# Patient Record
Sex: FEMALE | Race: WHITE | NOT HISPANIC OR LATINO | Employment: FULL TIME | URBAN - METROPOLITAN AREA
[De-identification: names, ages, dates, MRNs, and addresses within clinical notes are randomized per-mention and may not be internally consistent; named-entity substitution may affect disease eponyms.]

---

## 2019-04-12 ENCOUNTER — OFFICE VISIT (OUTPATIENT)
Dept: OBGYN CLINIC | Facility: CLINIC | Age: 49
End: 2019-04-12
Payer: COMMERCIAL

## 2019-04-12 ENCOUNTER — APPOINTMENT (OUTPATIENT)
Dept: RADIOLOGY | Facility: CLINIC | Age: 49
End: 2019-04-12
Payer: COMMERCIAL

## 2019-04-12 VITALS
DIASTOLIC BLOOD PRESSURE: 81 MMHG | HEIGHT: 64 IN | SYSTOLIC BLOOD PRESSURE: 121 MMHG | BODY MASS INDEX: 33.29 KG/M2 | WEIGHT: 195 LBS | HEART RATE: 76 BPM

## 2019-04-12 DIAGNOSIS — M25.572 LEFT ANKLE PAIN, UNSPECIFIED CHRONICITY: ICD-10-CM

## 2019-04-12 DIAGNOSIS — M84.375A STRESS FRACTURE OF CALCANEUS, LEFT, INITIAL ENCOUNTER: Primary | ICD-10-CM

## 2019-04-12 PROCEDURE — 73610 X-RAY EXAM OF ANKLE: CPT

## 2019-04-12 PROCEDURE — 99203 OFFICE O/P NEW LOW 30 MIN: CPT | Performed by: ORTHOPAEDIC SURGERY

## 2019-04-12 RX ORDER — MELOXICAM 15 MG/1
1 TABLET ORAL
COMMUNITY
Start: 2019-03-05

## 2019-05-24 ENCOUNTER — OFFICE VISIT (OUTPATIENT)
Dept: OBGYN CLINIC | Facility: HOSPITAL | Age: 49
End: 2019-05-24
Payer: COMMERCIAL

## 2019-05-24 VITALS
DIASTOLIC BLOOD PRESSURE: 74 MMHG | BODY MASS INDEX: 33.47 KG/M2 | SYSTOLIC BLOOD PRESSURE: 117 MMHG | HEIGHT: 64 IN | HEART RATE: 96 BPM

## 2019-05-24 DIAGNOSIS — M84.375D: ICD-10-CM

## 2019-05-24 DIAGNOSIS — M76.822 POSTERIOR TIBIAL TENDONITIS, LEFT: Primary | ICD-10-CM

## 2019-05-24 PROCEDURE — 99213 OFFICE O/P EST LOW 20 MIN: CPT | Performed by: ORTHOPAEDIC SURGERY

## 2019-08-06 ENCOUNTER — OFFICE VISIT (OUTPATIENT)
Dept: OBGYN CLINIC | Facility: CLINIC | Age: 49
End: 2019-08-06
Payer: COMMERCIAL

## 2019-08-06 VITALS
HEIGHT: 64 IN | DIASTOLIC BLOOD PRESSURE: 74 MMHG | BODY MASS INDEX: 33.29 KG/M2 | SYSTOLIC BLOOD PRESSURE: 126 MMHG | WEIGHT: 195 LBS | HEART RATE: 76 BPM

## 2019-08-06 DIAGNOSIS — M72.2 PLANTAR FASCIITIS OF LEFT FOOT: ICD-10-CM

## 2019-08-06 DIAGNOSIS — M76.62 ACHILLES TENDINITIS OF LEFT LOWER EXTREMITY: Primary | ICD-10-CM

## 2019-08-06 PROCEDURE — 99213 OFFICE O/P EST LOW 20 MIN: CPT | Performed by: ORTHOPAEDIC SURGERY

## 2019-08-06 RX ORDER — PREDNISONE 20 MG/1
TABLET ORAL
COMMUNITY
Start: 2019-06-26 | End: 2020-02-03

## 2019-08-06 RX ORDER — LEVOCETIRIZINE DIHYDROCHLORIDE 5 MG/1
TABLET, FILM COATED ORAL
COMMUNITY
Start: 2019-06-26

## 2019-08-06 NOTE — PATIENT INSTRUCTIONS
Plantar Fasciitis   Page Content   If your first few steps out of bed in the morning cause severe pain in the heel of your foot, you may have plantar fasciitis (fashee-EYE-tiss), an overuse injury that affects the sole of the foot  A diagnosis of plantar fasciitis means you have inflamed the tough, fibrous band of tissue (fascia) connecting your heel bone to the base of your toes  You're more likely to develop the condition if you're female, overweight or have a job that requires a lot of walking or standing on hard surfaces  You're also at risk if you walk or run for exercise, especially if you have tight calf muscles that limit how far you can flex your ankles  People with very flat feet or very high arches also are more prone to plantar fasciitis  The condition typically starts gradually with mild pain at the heel bone often referred to as a stone bruise  You're more likely to feel it after (not during) exercise  The pain classically occurs right after getting up in the morning and after a period of sitting  If you don't treat plantar fasciitis, it may become a chronic condition  You may not be able to keep up your level of activity, and you may develop symptoms of foot, knee, hip and back problems because plantar fasciitis can change the way you walk  Treatment  Stretching is the best treatment for plantar fasciitis  It may help to try to keep weight off your foot until the initial inflammation goes away  You can also apply ice to the sore area for 20 minutes three or four times a day to relieve your symptoms  Often a doctor will prescribe a nonsteroidal anti-inflammatory medication such as ibuprofen or naproxen  Home exercises to stretch your Achilles tendon and plantar fascia are the mainstay of treatment and reduce the chance of recurrence  In one exercise, you lean forward against a wall with one knee straight and heel on the ground  Your other knee is bent   Your heel cord and foot arch stretch as you lean  Hold for 10 seconds, relax and straighten up  Repeat 20 times for each sore heel  It is important to keep the knee fully extended on the side being stretched  In another exercise, you lean forward onto a countertop, spreading your feet apart with one foot in front of the other  Flex your knees and squat down, keeping your heels on the ground as long as possible  Your heel cords and foot arches will stretch as the heels come up in the stretch  Hold for 10 seconds, relax and straighten up  Repeat 20 times  About 90 percent of people with plantar fasciitis improve significantly after two months of initial treatment  You may be advised to use shoes with shock-absorbing soles or fitted with an off-the-shelf shoe insert device like a rubber heel pad  Your foot may be taped into a specific position  If your plantar fasciitis continues after a few months of conservative treatment, your doctor may inject your heel with steroidal anti-inflammatory medication  If you still have symptoms, you may need to wear a walking cast for two to three weeks or a positional splint when you sleep  In a few cases, surgery is needed for chronically contracted tissue  Plantar Fascia-Specific Stretching Program  1  Cross your affected leg over your other leg  2  Using the hand on your affected side, take hold of your affected foot and pull your toes back towards shin  This creates tension/stretch in the arch of the foot/plantar fascia  3  Check for the appropriate stretch position by gently rubbing the thumb of your unaffected side left to right over the arch of the affected foot  The plantar fascia should feel firm, like a guitar string  4  Hold the stretch for a count of 10  A set is 10 repetitions  Perform at least three sets of stretches per day  You cannot perform the stretch too often   The most important times to stretch are before taking the first step in the morning and before standing after a period of prolonged sitting  Anti-inflammatory Medication  Anti-inflammatory medications can help decrease the inflammation in the arch and heel of your foot  These medications include Advil®, Motrin®, Ibuprofen, and Aleve®  1  Use the medication as directed on the package  If you tolerate it well, take it daily for two weeks then discontinue for one week  If symptoms worsen or return, resume for two weeks, then stop  2  You should eat when taking these medications, as they can be hard on your stomach  Arch Support  1  Over the counter inserts [facility]'s) provide added arch support and soft cushion  2  Based on the individual needs of your foot, you may require custom inserts  Additional Stretch: Achilles Tendon Stretch  1  Place a shoe insert under your affected foot  2  Place your affected leg behind your unaffected leg with the toes of your back foot pointed towards the heel of your other foot  3  Lean into the wall  4  Bend your front knee while keeping your back leg straight with your heel firmly on the ground  5  Hold the stretch for a count of 10  A set is 10 repetitions  6  Perform the stretch at least three times a day  Achilles Tendonitis (Tendinitis)  Tendonitis a swelling and soreness of the tendon  The pain in the tendon (cord like structure which attaches muscle to bone) is produced by tiny tears and the inflammation present in that tendon  It commonly occurs at the shoulders, heels, and elbows  It is usually caused by overusing the tendon and joint involved  Achilles tendonitis involves the Achilles tendon  This is the large tendon in the back of the leg just above the foot  It attaches the large muscles of the lower leg to the heel bone (called calcaneus)  This diagnosis (learning what is wrong) is made by examination  X-rays will be generally be normal if only tendonitis is present   However, occasionally with insertional achilles tendinitis, there can be a calcification (enthesopathy/bone spur) noted  HOME CARE INSTRUCTIONS  · Apply ice to the injury for 10 to 20 minutes 3 or 4 times per day  Put the ice in a plastic bag and place a towel between the bag of ice and your skin  · Use crutches as instructed  · Try to avoid use other than gentle range of motion while the tendon is painful  Do not resume use until instructed by your caregiver  Then begin use gradually  Do not increase use to the point of pain  If pain does develop, decrease use and continue the above measures  Gradually increase activities that do not cause discomfort until you gradually achieve normal use  · Only take over-the-counter or prescription medicines for pain, discomfort, or fever as directed by your caregiver  · Specific achilles stretches and rehab are usually required for tendinitis that does not improve with acute treatment  Typically these are done under the care of a physical therapist     [address] IF:  · Your pain and swelling increase or pain is uncontrolled with medications  · You develop new, unexplained problems (symptoms) or an increase of the symptoms that brought you to your caregiver  · You develop an inability to move your toes or foot, develop warmth and swelling in your foot, or begin running an unexplained temperature  MAKE SURE YOU:   · Understand these instructions  · Will watch your condition  · Will get help right away if you are not doing well or get worse  Rehab  Achilles tendonitis is disorder of the Achilles tendon  The Achiles tendon connects the large calf muscles (Gastrocnemius and Soleus) to the heel bone (calcaneus)  This tendon is sometimes called the heel cord  It is important for pushing-off and standing on your toes and is important for walking, running, or jumping  Tendonitis often caused by overuse and repetitive microtrauma      SYMPTOMS  · Pain, tenderness, swelling, warmth, and redness may occur over the Achilles tendon even at rest    · Pain with pushing off, or flexing or extending the ankle  · Pain that is worsened after or during activity  CAUSES  · Over use sometimes seen with rapid increase in exercise programs or in sports requiring running and jumping  · Poor physical conditioning (strength and flexibility/endurance)  · Running sports, especially training running down hills  · Inadequate warm-up before practice or play or failure to stretch before participation  · Injury to the tendon  PREVENTION   · Warm up and stretch before practice or competition  · Allow time for adequate rest and recovery between practices and competition  · Keep up conditioning  · Keep up ankle and leg flexibility  · Improve or keep muscle strength and endurance  · Improve cardiovascular fitness  · Use proper technique  · Use of proper equipment (shoes, skates, etc)   · To help prevent recurrence, taping, protective strapping, or an adhesive bandage may be recommended for several weeks after healing is complete  PROGNOSIS  · Recovery may take weeks to several months to heal    · Longer recovery is expected if symptoms have been prolonged   · Recovery is usually quicker if the inflammation is due to a direct blow as compared with overuse or sudden strain  COMPLICATIONS   · Healing time will be prolonged if the condition is not correctly treated  The injury must be given plenty of time to heal    · Symptoms can reoccur if activity is resumed too soon  · Untreated, tendinitis may increase the risk of tendon rupture requiring additional time for recovery and possibly surgery  TREATMENT   · The first treatment consists of, rest, anti-inflammatory medication and ice to relieve the pain  · Stretching and strengthening exercises after resolution of pain, will likely help reduce the risk of recurrence   Referral to a physical therapist or  for further evaluation and treatment may be helpful  · A walking boot or cast may be recommended to rest the Achilles tendon  This can help break the cycle of inflammation and microtrauma  · Arch supports (orthotics ) may be prescribed or recommended by your caregiver as an adjunct to therapy and rest    · Surgery to remove the inflamed tendon lining or degenerated tendon tissue is rarely necessary and has shown less than predictable results  MEDICATION   · Nonsteroidal anti-inflammatory medications, such as aspirin and ibuprofen, may be used for pain and inflammation relief  Do not take within 7 days before surgery  Take these as directed by your caregiver  Contact your caregiver immediately if any bleeding, stomach upset, or signs of allergic reaction occur  Other minor pain relievers, such as acetaminophen, may also be used  · Pain relievers may be prescribed as necessary by your caregiver  Do not take prescription pain medication for longer than 4 to 7 days  Use only as directed and only as much as you need  · Cortisone injections are rarely if ever indicated  Cortisone injections may weaken tendons and predispose to rupture  It is better to give the condition more time to heal than to use them  HEAT AND COLD:   · Cold is used to relieve pain and reduce inflammation for acute and chronic Achilles tendinitis  Cold should be applied for 10 to 15 minutes every 2 to 3 hours for inflammation and pain and immediately after any activity that aggravates your symptoms  Use ice packs or an ice massage  · Heat may be used before performing stretching and strengthening activities prescribed by your caregiver  Use a heat pack or a warm soak  SEEK MEDICAL CARE IF:   · Symptoms get worse or do not improve in 2 weeks despite treatment  · New, unexplained symptoms develop  Drugs used in treatment may produce side effects       EXERCISES  RANGE OF MOTION AND STRETCHING EXERCISES - Achilles Tendinitis   These exercises may help you when beginning to rehabilitate your injury  Your symptoms may resolve with or without further involvement from your physician, physical therapist or   While completing these exercises, remember:   · Restoring tissue flexibility helps normal motion to return to the joints  This allows healthier, less painful movement and activity  · An effective stretch should be held for at least 30 seconds  · A stretch should never be painful  You should only feel a gentle lengthening or release in the stretched tissue  STRETCH - Gastroc, Standing   · Place hands on wall  · Extend leg, keeping the front knee somewhat bent  · Slightly point your toes inward on your back foot  · Keeping your heel on the floor and your knee straight, shift your weight toward the wall, not allowing your back to arch  · You should feel a gentle stretch in the calf  Hold this position for __________ seconds  STRETCH - Soleus, Standing   · Place hands on wall  · Extend leg, keeping the other knee somewhat bent  · Slightly point your toes inward on your back foot  · Keep your heel on the floor, bend your back knee, and slightly shift your weight over the back leg so that you feel a gentle stretch deep in your back calf  · Hold this position for 10 seconds     STRETCH - Gastrocsoleus, Standing   Note: This exercise can place a lot of stress on your foot and ankle  Please complete this exercise only if specifically instructed by your caregiver  · Place the ball of your foot on a step, keeping your other foot firmly on the same step  · Hold on to the wall or a rail for balance  · Slowly lift your other foot, allowing your body weight to press your heel down over the edge of the step  · You should feel a stretch in your calf  · Hold this position for 10 seconds  · Repeat this exercise with a slight bend in your knee       STRENGTHENING EXERCISES - Achilles Tendinitis  These exercises may help you when beginning to rehabilitate your injury  They may resolve your symptoms with or without further involvement from your physician, physical therapist or   While completing these exercises, remember:   · Muscles can gain both the endurance and the strength needed for everyday activities through controlled exercises  · Complete these exercises as instructed by your physician, physical therapist or   Progress the resistance and repetitions only as guided  · You may experience muscle soreness or fatigue, but the pain or discomfort you are trying to eliminate should never worsen during these exercises  If this pain does worsen, stop and make certain you are following the directions exactly  If the pain is still present after adjustments, discontinue the exercise until you can discuss the trouble with your clinician  STRENGTH - Plantar-flexors   · Sit with your __________ leg extended  Holding onto both ends of a rubber exercise band/tubing, loop it around the ball of your foot  Keep a slight tension in the band  · Slowly push your toes away from you, pointing them downward  · Hold this position for __________ seconds  Return slowly, controlling the tension in the band/tubing  STRENGTH - Plantar-flexors   · Stand with your feet shoulder width apart  Steady yourself with a wall or table using as little support as needed  · Keeping your weight evenly spread over the width of your feet, rise up on your toes  *     STRENGTH - Plantar-flexors, Eccentric   Note: This exercise can place a lot of stress on your foot and ankle  Please complete this exercise only if specifically instructed by your caregiver  · Place the balls of your feet on a step  With your hands, use only enough support from a wall or rail to keep your balance  · Keep your knees straight and rise up on your toes  · Slowly shift your weight entirely to your toes and  your opposite foot   Gently and with controlled movement, lower your weight through your foot so that your heel drops below the level of the step  You will feel a slight stretch in the back of your calf at the end position  · Use the healthy leg to help rise up onto the balls of both feet, then lower weight only on the leg again  Build up to 15 repetitions  Then progress to 3 consecutive sets of 15 repetitions  *   · After completing the above exercise, complete the same exercise with a slight knee bend (about 30 degrees)  Again, build up to 15 repetitions  Then progress to 3 consecutive sets of 15 repetitions  *   *When you easily complete 3 sets of 15, your physician, physical therapist or  may advise you to add resistance by wearing a backpack filled with additional weight  STRENGTH - Plantar Flexors, Seated   · Sit on a chair that allows your feet to rest flat on the ground  If necessary, sit at the edge of the chair  · Keeping your toes firmly on the ground, lift your heel as far as you can without increasing any discomfort in your ankle  [email or web address]  pdf

## 2019-08-06 NOTE — PROGRESS NOTES
YULISA Tanenr  Attending, Orthopaedic Surgery  [facility] [address] [facility] [address] [facility]      ORTHOPAEDIC FOOT AND ANKLE CLINIC VISIT     Assessment:     Encounter Diagnoses   Name Primary?  Achilles tendinitis of left lower extremity Yes    Plantar fasciitis of left foot             Plan:   · The patient verbalized understanding of exam findings and treatment plan  We engaged in the shared decision-making process and treatment options were discussed at length with the patient  Surgical and conservative management discussed today along with risks and benefits  · Activity modification was discussed and advised, AVSS returning to walking instructions given  · Continue with visco heel cups in proper shoe wear  · Perform HEP, AVSS for achilles tendinitis and plantar fasciitis with exercises was given  · Utilize ice massage after long periods of time on feet  Return in about 2 months (around 10/6/2019) for Recheck left foot   Leona Gao History of Present Illness:   Chief Complaint:   Chief Complaint   Patient presents with    Left Ankle - Follow-up     Kyle Saul is a 44 y o  female who is being seen in follow-up for left posterior tibialis tendonitis  When we last saw she we recommended physical therapy  Pain has 100% improved for posterior tibialis tendonitis and heel pain associated with calcaneal stress fracture  Pain is present of the plantar fascia and posterior heel at the achilles insertion with minimal radiating and described as sharp and severe  She states pain of the plantar fascia when standing after periods of rest and a few steps in the morning  Achilles tendon pain after doing stretching exercises on the stairs       Pain/symptom timing:  Worse during the day when active  Pain/symptom context:  Worse with activites and work  Pain/symptom modifying factors:  Rest makes better, activities make worse  Pain/symptom associated signs/symptoms: none    Prior treatment   · NSAIDsYes   · Injections No   · Bracing/Orthotics Yes    · Physical Therapy Yes     Orthopedic Surgical History:   See below  Past Medical, Surgical and Social History:  Past Medical History:  has no past medical history on file  Problem List: does not have any pertinent problems on file  Past Surgical History:  has a past surgical history that includes Gallbladder surgery and Hand surgery  Family History: family history includes Cancer in her mother  Social History:  reports that she has never smoked  She has never used smokeless tobacco  Her alcohol and drug histories are not on file  Current Medications: has a current medication list which includes the following prescription(s): levocetirizine, meloxicam, and prednisone  Allergies: has No Known Allergies  Review of Systems:  General- denies fever/chills  HEENT- denies hearing loss or sore throat  Eyes- denies eye pain or visual disturbances, denies red eyes  Respiratory- denies cough or SOB  Cardio- denies chest pain or palpitations  GI- denies abdominal pain  Endocrine- denies urinary frequency  Urinary- denies pain with urination  Musculoskeletal- Negative except noted above  Skin- denies rashes or wounds  Neurological- denies dizziness or headache  Psychiatric- denies anxiety or difficulty concentrating    Physical Exam:   /74 (BP Location: Right arm, Patient Position: Sitting, Cuff Size: Adult)   Pulse 76   Ht 5' 4" (1 626 m)   Wt 88 5 kg (195 lb)   BMI 33 47 kg/m²   General/Constitutional: No apparent distress: well-nourished and well developed  Eyes: normal ocular motion  Lymphatic: No appreciable lymphadenopathy  Respiratory: Non-labored breathing  Vascular: No edema, swelling or tenderness, except as noted in detailed exam   Integumentary: No impressive skin lesions present, except as noted in detailed exam   Neuro: No ataxia or tremors noted  Psych: Normal mood and affect, oriented to person, place and time  Appropriate affect  Musculoskeletal: Normal, except as noted in detailed exam and in HPI  Examination    Left    Gait Normal   Musculoskeletal Tender to palpation at achilles tendon insertion posterior heel, medial of band of the plantar fascia  No ttp of the ptt, No ttp of the calcaneus  Skin normal    Nails Normal    Range of Motion  full degrees dorsiflexion, full degrees plantarflexion  Subtalar motion: normal     Stability Stable    Muscle Strength 5/5 tibialis anterior  5/5 gastrocnemius-soleus  5/5 posterior tibialis  5/5 peroneal/eversion strength  5/5 EHL  5/5 FHL    Neurologic Normal    Sensation Intact to light touch throughout sural, saphenous, superficial peroneal, deep peroneal and medial/lateral plantar nerve distributions  [place] 5 07 filament (10g) testing deferred  Cardiovascular Brisk capillary refill < 2 seconds,intact DP and PT pulses    Special Tests Calcaneal Squeeze Test:  Negative  Imaging Studies:   No new imaging    Scribe Attestation    I,:   Ronaldo Oscar am acting as a scribe while in the presence of the attending physician :        I,:   Amanda Israel MD personally performed the services described in this documentation    as scribed in my presence :              Lyanne Coddington Lachman, MD  Foot & Ankle Surgery   Department of [address] [place] [address]      I personally performed the service  Lyanne Coddington Lachman, MD

## 2019-10-15 ENCOUNTER — OFFICE VISIT (OUTPATIENT)
Dept: OBGYN CLINIC | Facility: CLINIC | Age: 49
End: 2019-10-15
Payer: COMMERCIAL

## 2019-10-15 VITALS
WEIGHT: 181 LBS | HEIGHT: 64 IN | DIASTOLIC BLOOD PRESSURE: 78 MMHG | HEART RATE: 94 BPM | BODY MASS INDEX: 30.9 KG/M2 | SYSTOLIC BLOOD PRESSURE: 122 MMHG

## 2019-10-15 DIAGNOSIS — M72.2 PLANTAR FASCIITIS OF LEFT FOOT: Primary | ICD-10-CM

## 2019-10-15 DIAGNOSIS — M84.375D: ICD-10-CM

## 2019-10-15 DIAGNOSIS — M76.822 POSTERIOR TIBIAL TENDONITIS, LEFT: ICD-10-CM

## 2019-10-15 PROCEDURE — 99213 OFFICE O/P EST LOW 20 MIN: CPT | Performed by: ORTHOPAEDIC SURGERY

## 2019-10-15 NOTE — PROGRESS NOTES
YULISA Linares  Attending, Orthopaedic Surgery  [facility] and [address] [facility] [address] [facility]      ORTHOPAEDIC FOOT AND ANKLE CLINIC VISIT     Assessment:     Encounter Diagnoses   Name Primary?  Posterior tibial tendonitis, left     Stress fracture of calcaneus with routine healing, left     Plantar fasciitis of left foot Yes            Plan:   · The patient verbalized understanding of exam findings and treatment plan  We engaged in the shared decision-making process and treatment options were discussed at length with the patient  Surgical and conservative management discussed today along with risks and benefits  · Her calcaneal stress fracture and Posterior tibial tendonitis has completely resolved  · She has plantar fascia pain which we expect to continue to improve  · Visco heels  · Rest and activity modification  · Ice and elevation, NSAIDs as needed  · Follow-up PRN      History of Present Illness:   Chief Complaint: Left plantar heel pain  Shari Hull is a 44 y o  female who is being seen in follow-up for left plantar fasciitis, PT Tendonitis, calcaneal stress fracture  When we last saw she we recommended PT  Pain has slightly improved  Residual pain is localized at plantar medial heel with minimal radiating and described as sharp and severe  Pain/symptom timing:  Worse during the day when active  Pain/symptom context:  Worse with activites and work  Pain/symptom modifying factors:  Rest makes better, activities make worse  Pain/symptom associated signs/symptoms: none    Prior treatment   · NSAIDsYes   · Injections No   · Bracing/Orthotics Yes    · Physical Therapy Yes     Orthopedic Surgical History:   See below    Past Medical, Surgical and Social History:  Past Medical History:  has no past medical history on file  Problem List: does not have any pertinent problems on file    Past Surgical History:  has a past surgical history that includes Gallbladder surgery and Hand surgery  Family History: family history includes Cancer in her mother  Social History:  reports that she has never smoked  She has never used smokeless tobacco  Her alcohol and drug histories are not on file  Current Medications: has a current medication list which includes the following prescription(s): levocetirizine, meloxicam, and prednisone  Allergies: has No Known Allergies  Review of Systems:  General- denies fever/chills  HEENT- denies hearing loss or sore throat  Eyes- denies eye pain or visual disturbances, denies red eyes  Respiratory- denies cough or SOB  Cardio- denies chest pain or palpitations  GI- denies abdominal pain  Endocrine- denies urinary frequency  Urinary- denies pain with urination  Musculoskeletal- Negative except noted above  Skin- denies rashes or wounds  Neurological- denies dizziness or headache  Psychiatric- denies anxiety or difficulty concentrating    Physical Exam:   There were no vitals taken for this visit  General/Constitutional: No apparent distress: well-nourished and well developed  Eyes: normal ocular motion  Lymphatic: No appreciable lymphadenopathy  Respiratory: Non-labored breathing  Vascular: No edema, swelling or tenderness, except as noted in detailed exam   Integumentary: No impressive skin lesions present, except as noted in detailed exam   Neuro: No ataxia or tremors noted  Psych: Normal mood and affect, oriented to person, place and time  Appropriate affect  Musculoskeletal: Normal, except as noted in detailed exam and in HPI      Examination    Left    Gait Normal   Musculoskeletal Tender to palpation at plantar medial heel    Skin Normal       Nails Normal    Range of Motion  20 degrees dorsiflexion, 40 degrees plantarflexion  Subtalar motion: normal    Stability Stable    Muscle Strength 5/5 tibialis anterior  5/5 gastrocnemius-soleus  5/5 posterior tibialis  5/5 peroneal/eversion strength  5/5 EHL  5/5 FHL    Neurologic Normal    Sensation Intact to light touch throughout sural, saphenous, superficial peroneal, deep peroneal and medial/lateral plantar nerve distributions  [place] 5 07 filament (10g) testing deferred  Cardiovascular Brisk capillary refill < 2 seconds,intact DP and PT pulses    Special Tests None      Imaging Studies:   No new imaging      James R Lachman, MD  Foot & Ankle Surgery   Department of [address] [place] [address]      I personally performed the service  Ignacio Killings Lachman, MD

## 2020-02-03 ENCOUNTER — OFFICE VISIT (OUTPATIENT)
Dept: OBGYN CLINIC | Facility: HOSPITAL | Age: 50
End: 2020-02-03
Payer: COMMERCIAL

## 2020-02-03 ENCOUNTER — HOSPITAL ENCOUNTER (OUTPATIENT)
Dept: RADIOLOGY | Facility: HOSPITAL | Age: 50
Discharge: HOME | End: 2020-02-03
Attending: ORTHOPAEDIC SURGERY
Payer: COMMERCIAL

## 2020-02-03 VITALS
WEIGHT: 178 LBS | HEIGHT: 64 IN | HEART RATE: 91 BPM | SYSTOLIC BLOOD PRESSURE: 162 MMHG | BODY MASS INDEX: 30.39 KG/M2 | DIASTOLIC BLOOD PRESSURE: 79 MMHG

## 2020-02-03 DIAGNOSIS — M75.02 ADHESIVE CAPSULITIS OF LEFT SHOULDER: Primary | ICD-10-CM

## 2020-02-03 DIAGNOSIS — M25.512 LEFT SHOULDER PAIN, UNSPECIFIED CHRONICITY: ICD-10-CM

## 2020-02-03 PROCEDURE — 20610 DRAIN/INJ JOINT/BURSA W/O US: CPT | Performed by: ORTHOPAEDIC SURGERY

## 2020-02-03 PROCEDURE — 73030 X-RAY EXAM OF SHOULDER: CPT

## 2020-02-03 PROCEDURE — 99214 OFFICE O/P EST MOD 30 MIN: CPT | Performed by: ORTHOPAEDIC SURGERY

## 2020-02-03 RX ORDER — BUPIVACAINE HYDROCHLORIDE 2.5 MG/ML
2 INJECTION, SOLUTION INFILTRATION; PERINEURAL
Status: COMPLETED | OUTPATIENT
Start: 2020-02-03 | End: 2020-02-03

## 2020-02-03 RX ORDER — BETAMETHASONE SODIUM PHOSPHATE AND BETAMETHASONE ACETATE 3; 3 MG/ML; MG/ML
6 INJECTION, SUSPENSION INTRA-ARTICULAR; INTRALESIONAL; INTRAMUSCULAR; SOFT TISSUE
Status: COMPLETED | OUTPATIENT
Start: 2020-02-03 | End: 2020-02-03

## 2020-02-03 RX ADMIN — BUPIVACAINE HYDROCHLORIDE 2 ML: 2.5 INJECTION, SOLUTION INFILTRATION; PERINEURAL at 11:10

## 2020-02-03 RX ADMIN — BETAMETHASONE SODIUM PHOSPHATE AND BETAMETHASONE ACETATE 6 MG: 3; 3 INJECTION, SUSPENSION INTRA-ARTICULAR; INTRALESIONAL; INTRAMUSCULAR; SOFT TISSUE at 11:10

## 2020-02-03 NOTE — PROGRESS NOTES
Assessment  Diagnoses and all orders for this visit:    Adhesive capsulitis of left shoulder        Discussion and Plan:    · Explained to the patient that her symptoms and physical exam were consistent with early adhesive capsulitis of the left shoulder - although I also mentioned to her that her motion is very limited and some of this may be secondary to guarding, it is impossible for me to be 100% confident based on the exam today and I certainly do not wish to make her more uncomfortable by pushing the examination  The path anatomy and natural history of the above-mentioned diagnosis was explained in detail to the patient today in the office  She understood all questions were answered  · Patient was provided with a cortisone injection today in the office which was the best way I could help her symptoms acutely  This documented appropriately below  · She will also begin formal physical therapy for stretching exercises of the left shoulder  She is to transition into a home exercise program as tolerated  · If symptoms persist but range of motion improves after a course of formal physical therapy and the cortisone injection performed today, an MRI of her left shoulder will be warranted to evaluate for possible internal derangement and/or rotator cuff pathology  · She will follow up in 3 months for re-evaluation of her symptoms or sooner if her symptoms require  Subjective:   Patient ID: Cindy Bamberger is a 44 y o  female      The patient presents with a chief complaint of left shoulder pain  The pain began 6 week(s) ago and is not associated with an acute injury  The patient describes the pain as aching and dull in intensity,  constant in timing, and localizes the pain to the  left subacromial joint, deltoid  The pain is worse with movement, overhead work and raising arm over head and relieved by rest, ice, avoiding the painful activities  The pain is not associated with numbness and tingling    The pain is not associated with constitutional symptoms  The patient is awoken at night by the pain  The patient has had no prior treatment  The following portions of the patient's history were reviewed and updated as appropriate: allergies, current medications, past family history, past medical history, past social history, past surgical history and problem list     Review of Systems   Constitutional: Negative for chills, fever and unexpected weight change  HENT: Negative for hearing loss, nosebleeds and sore throat  Eyes: Negative for pain, redness and visual disturbance  Respiratory: Negative for cough, shortness of breath and wheezing  Cardiovascular: Negative for chest pain, palpitations and leg swelling  Gastrointestinal: Negative for abdominal distention, nausea and vomiting  Endocrine: Negative for polydipsia and polyuria  Genitourinary: Negative for dysuria and hematuria  Skin: Negative for rash and wound  Neurological: Negative for dizziness, numbness and headaches  Psychiatric/Behavioral: Negative for decreased concentration and suicidal ideas  Objective:  /79   Pulse 91   Ht 5' 4" (1 626 m)   Wt 80 7 kg (178 lb) Comment: verbal  BMI 30 55 kg/m²       Left Shoulder Exam     Tenderness   Left shoulder tenderness location: Diffusely about the shoulder  Range of Motion   External rotation: 20   Forward flexion: 100   Internal rotation 0 degrees: Sacrum     Tests   Marroquin test: positive    Other   Erythema: absent  Sensation: normal  Pulse: present               Physical Exam   Constitutional: She is oriented to person, place, and time  She appears well-developed and well-nourished  Eyes: Pupils are equal, round, and reactive to light  Pulmonary/Chest: Effort normal and breath sounds normal    Neurological: She is alert and oriented to person, place, and time  Skin: Skin is warm and dry  Psychiatric: She has a normal mood and affect   Her behavior is normal  Judgment and thought content normal      Large joint arthrocentesis: L subacromial bursa  Date/Time: 2/3/2020 11:10 AM  Consent given by: patient  Site marked: site marked  Timeout: Immediately prior to procedure a time out was called to verify the correct patient, procedure, equipment, support staff and site/side marked as required   Supporting Documentation  Indications: pain and diagnostic evaluation   Procedure Details  Location: shoulder - L subacromial bursa  Preparation: Patient was prepped and draped in the usual sterile fashion  Needle size: 22 G  Ultrasound guidance: no  Approach: lateral  Medications administered: 2 mL bupivacaine 0 25 %; 6 mg betamethasone acetate-betamethasone sodium phosphate 6 (3-3) mg/mL    Patient tolerance: patient tolerated the procedure well with no immediate complications  Dressing:  Sterile dressing applied          I have personally reviewed pertinent films in PACS and my interpretation is as follows  X-ray Left Shoulder: No acute osseous abnormality or degenerative changes       Scribe Attestation    I,:   Lb Lagunas am acting as a scribe while in the presence of the attending physician :        I,:   Jose Berger MD personally performed the services described in this documentation    as scribed in my presence :

## 2020-02-03 NOTE — PATIENT INSTRUCTIONS

## 2024-10-31 ENCOUNTER — TELEPHONE (OUTPATIENT)
Age: 54
End: 2024-10-31

## 2024-10-31 NOTE — TELEPHONE ENCOUNTER
Caller: Patient     Doctor: Lory    Reason for call: Patient received a phone call stating she had to reschedule her appt for today. She stated the message informed her she could still come in today, but earlier. Please advise.     She was rescheduled to first available.     Call back#: [phone]

## 2024-11-07 ENCOUNTER — OFFICE VISIT (OUTPATIENT)
Dept: OBGYN CLINIC | Facility: HOSPITAL | Age: 54
End: 2024-11-07
Payer: COMMERCIAL

## 2024-11-07 VITALS — BODY MASS INDEX: 30.55 KG/M2 | HEIGHT: 64 IN

## 2024-11-07 DIAGNOSIS — G56.22 CUBITAL TUNNEL SYNDROME ON LEFT: Primary | ICD-10-CM

## 2024-11-07 DIAGNOSIS — M18.12 ARTHRITIS OF CARPOMETACARPAL (CMC) JOINT OF LEFT THUMB: ICD-10-CM

## 2024-11-07 DIAGNOSIS — S54.02XA NEUROPRAXIA OF LEFT ULNAR NERVE, INITIAL ENCOUNTER: ICD-10-CM

## 2024-11-07 PROCEDURE — 99214 OFFICE O/P EST MOD 30 MIN: CPT | Performed by: SURGERY

## 2024-11-07 NOTE — PROGRESS NOTES
Tato Henley M.D.  Attending, Orthopaedic Surgery  Hand, Wrist, and Elbow Surgery  [facility]      ORTHOPAEDIC HAND, WRIST, AND ELBOW OFFICE  VISIT       ASSESSMENT/PLAN:      54 year old female here for her left ulnar neurapraxia, DOI mid- September. Overall, the sensation is is improving and the motor function is getting better.  After reviewing the EMG, difficult to localize the level of ulnar nerve pathology.   At this time, we recommend OT to work on , motion, strength of ulnar nerve glides. Obtain US of the left wrist and elbow to evaluate the ulnar nerve at the Guyon's canal and cubital tunnel with a possible steroid injection to the cubital tunnel.   It's recommended to watch and wait to see how conservative treatments work for her residual symptoms since they are better than where they started.   She has left thumb arthritis that can be treated conservatively with heat, topical voltaren gel 1%. Discussed conservative for arthritis is heating in the morning and evenings for 20 minutes apiece, using topical Voltaren gel, using a thumb Comfort Cool brace and potentially a localized cortisone injection.  Patient declined injection today but wished to proceed with the noninvasive treatments.  A thumb Comfort Cool brace was provided for the patient in the office today.  Will follow-up with the patient 8 weeks to see how the ultrasound-guided injection did and OT.  Greater than 30 minutes spent reviewing prior medical records ( EMG/MRI/Office notes from [facility])   The patient verbalized understanding of exam findings and treatment plan. We engaged in the shared decision-making process and treatment options were discussed at length with the patient. Surgical and conservative management discussed today along with risks and benefits.    Diagnoses and all orders for this visit:    Cubital tunnel syndrome on left  -     US MSK limited; Future  -     US MSK limited; Future  -     US  msk guidance; Future  -     Ambulatory Referral to PT/OT Hand Therapy; Future    Neuropraxia of left ulnar nerve, initial encounter  -     Ambulatory Referral to PT/OT Hand Therapy; Future        Follow Up:  No follow-ups on file.    To Do Next Visit:  Re-evaluation of current issue      General Discussions:  CMC Arthritis: The anatomy and physiology of carpometacarpal joint arthritis was discussed with the patient today in the office.  Deterioration of the articular cartilage eventually leads to hypermobility at the thumb CMC joint, resulting in joint subluxation, osteophyte formation, cystic changes within the trapezium and base of the first metacarpal, as well as subchondral sclerosis.  Eventually, pain, limited mobility, and compensatory hyperextension at the metacarpophalangeal joint may develop.  While normal activity and usage of the thumb joint may provide a painful experience to the patient, this typically does not result in damage to the thumb or hand.  Treatment options include resting thumb spica splints to decreased joint edema, pain, and inflammation.  Therapy exercises to strengthen the thenar musculature may relieve pain, but do not alter the overall continued development of osteoarthritis.  Oral medications, topical medications, corticosteroid injections may decrease pain and increase overall function.  Eventually, approximately 5% of patients may require surgical intervention.                                                                                                                                                                                               Cubital Tunnel Syndrome: The anatomy and physiology of cubital tunnel syndrome were discussed with the patient today in the office.  Typically, increased elbow flexion activities decrease blood flow within the intraneural spaces, resulting in a feeling of numbness, tingling, weakness, or clumsiness within the hand and fingers.   Occasionally, anatomic structures such as medial elbow osteophytes, the medial head of the triceps, were subluxing ulnar nerve may result in increased pressure or aggravation at the cubital tunnel.  Typical signs and symptoms usually include numbness and tingling within the ring and small finger, weakness with , and weakness with pinch.  Conservative treatment and includes nocturnal bracing to keep the elbow in a semi-extended position, activity modification, therapy, and avoiding excessive elbow flexion activities.  Vitamin B6 one tablet daily over the counter may helpful to reduce symptoms.  A majority of patients typically respond to conservative treatment over a period of approximately 3-6 months.  EMG/NCV testing of the ulnar nerve at the elbow is not as reliable as carpal tunnel syndrome.  Surgical intervention in the form of in situ release of the ulnar nerve at the elbow or ulnar nerve transposition may be required in up to 20% of patients.         ____________________________________________________________________________________________________________________________________________      CHIEF COMPLAINT:  Chief Complaint   Patient presents with    Left Hand - Pain     And wrist area. Has numbness and tingling. Has a hard time putting her fingers together.        SUBJECTIVE:  Sherry Franklin is a 54 y.o. year old RHD female who presents today for a second opinion in regards to her left elbow, wrist, and thumb pain , ulnar 1 1/2 digit  numbness and tingling, small finger weakness.  She recently saw Dr. Harrison Mejias on 10/28/2024 for the same issues, who recommended an endoscopic carpal tunnel release, L Guyon's canal release, left cubital tunnel revision release with submuscular transposition and possible nerve wrapping, AIN to ulnar motor nerve transfer, left thumb basal joint steroid injection.  She has a new onset of numbness and tingling to the 2 ulnar digits for 5 weeks. She has treated with a  medrol dose mary that helped.   H/o left ulnar nerve transposition 2008 in [place].   She no longer has the numbness in the small and ring fingers over the last 10 days, the small finger coming into the ring is getting better overall strength is improving   She has persistent thumb CMC pain, previous CSI > 10 years prior.       Pain/symptom timing:  Worse during the day when active  Pain/symptom context:  Worse with activites and work  Pain/symptom modifying factors:  Rest makes better, activities make worse  Pain/symptom associated signs/symptoms: none    Prior treatment   NSAIDsYes   Injections No   Bracing/Orthotics No    Physical Therapy No     I have personally reviewed all the relevant PMH, PSH, SH, FH, Medications and allergies      PAST MEDICAL HISTORY:  History reviewed. No pertinent past medical history.    PAST SURGICAL HISTORY:  Past Surgical History:   Procedure Laterality Date    GALLBLADDER SURGERY      HAND SURGERY      US GUIDED THYROID BIOPSY  9/24/2015       FAMILY HISTORY:  Family History   Problem Relation Age of Onset    Cancer Mother        SOCIAL HISTORY:  Social History     Tobacco Use    Smoking status: Never    Smokeless tobacco: Never       MEDICATIONS:    Current Outpatient Medications:     levocetirizine (XYZAL) 5 MG tablet, , Disp: , Rfl:     meloxicam (MOBIC) 15 mg tablet, Take 1 tablet by mouth, Disp: , Rfl:     ALLERGIES:  No Known Allergies        REVIEW OF SYSTEMS:  Review of Systems   Constitutional:  Negative for chills, fever and unexpected weight change.   HENT:  Negative for hearing loss, nosebleeds and sore throat.    Eyes:  Negative for pain, redness and visual disturbance.   Respiratory:  Negative for cough, shortness of breath and wheezing.    Cardiovascular:  Negative for chest pain, palpitations and leg swelling.   Gastrointestinal:  Negative for abdominal pain and nausea.   Genitourinary:  Negative for dyspareunia, dysuria and frequency.   Skin:  Negative for rash and  wound.   Neurological:  Positive for weakness and numbness. Negative for dizziness and headaches.   Psychiatric/Behavioral:  Negative for decreased concentration and suicidal ideas. The patient is not nervous/anxious.        VITALS:  Vitals:       LABS:      _____________________________________________________  PHYSICAL EXAMINATION:  General: well developed and well nourished, alert, oriented times 3, and appears comfortable  Psychiatric: Normal  HEENT: Normocephalic, Atraumatic Trachea Midline, No torticollis  Pulmonary: No audible wheezing or respiratory distress   Abdomen/GI: Non tender, non distended   Cardiovascular: No pitting edema, 2+ radial pulse   Skin: No masses, erythema, lacerations, fluctation, ulcerations  Neurovascular: Sensation intact to the Median Nerve, Sensation Intact to the Radial Nerve, Decreased Sensation to  the Ulnar Nerve, Motor Intact to the Median, Ulnar, Radial Nerve, and Pulses Intact  Musculoskeletal: Normal, except as noted in detailed exam and in HPI.      MUSCULOSKELETAL EXAMINATION:    Left Ulnar Nerve Exam:    Positive intrinsic atrophy.  Negative  deformity at the elbow.   Full range of motion with flexion and extension of the elbow.   Positive ulnar nerve compression test at the elbow. Negative tinels over the ulnar nerve at the elbow.  Negative cross finger test in the index and long fingers.   FDP strength is 4/5 to the ring finger. FDP strength is 4/5 to the small finger.  Intrinsic strength 4/5 .      ___________________________________________________  STUDIES REVIEWED:      I have  personally reviewed and interpreted MRI left wrist:   Mild ECU tendinosis.  Central perforation TFCC  Possible subacute partial SL ligament injury   Moderate first carpometacarpal joint primary osteoarthritis.    EMG report that was performed at [facility] was performed on 10/22/2024:  findings that are c/w ulnar neuropathy that involve the FCU and first dorsal interosseus  "muscle. Mild to moderate decrease in sensory velocity to the ulnar nerve. The distal sensory latency: Mild changes to the carpal tunnel. Motor latency: moderate changes to the ulnar nerve and median nerve at the carpal tunnel.  Impression:  Non localizable ulnar nerve  pathology, with changes in both FCU and FDI    LABS REVIEWED:    HgA1c: No results found for: \"HGBA1C\"  BMP:   Lab Results   Component Value Date    CALCIUM 9.5 07/15/2024    K 5.0 07/15/2024    CO2 27 07/15/2024     07/15/2024    BUN 16 07/15/2024    CREATININE 0.91 07/15/2024               PROCEDURES PERFORMED:  Procedures  No Procedures performed today    _____________________________________________________      Scribe Attestation      I,:  Awilda Chavez am acting as a scribe while in the presence of the attending physician.:       I,:  Tato Henley MD personally performed the services described in this documentation    as scribed in my presence.:                    "

## 2024-11-18 ENCOUNTER — EVALUATION (OUTPATIENT)
Dept: OCCUPATIONAL THERAPY | Facility: CLINIC | Age: 54
End: 2024-11-18
Payer: COMMERCIAL

## 2024-11-18 DIAGNOSIS — G56.22 CUBITAL TUNNEL SYNDROME ON LEFT: Primary | ICD-10-CM

## 2024-11-18 DIAGNOSIS — S54.02XA NEUROPRAXIA OF LEFT ULNAR NERVE, INITIAL ENCOUNTER: ICD-10-CM

## 2024-11-18 PROCEDURE — 97165 OT EVAL LOW COMPLEX 30 MIN: CPT

## 2024-11-18 PROCEDURE — 97110 THERAPEUTIC EXERCISES: CPT

## 2024-11-18 NOTE — PROGRESS NOTES
OT Evaluation     Today's date: 2024  Patient name: Sherry Franklin  : 1970  MRN: [id number]  Referring provider: Tato Henley MD  Dx:   Encounter Diagnosis     ICD-10-CM    1. Cubital tunnel syndrome on left  G56.22 Ambulatory Referral to PT/OT Hand Therapy      2. Neuropraxia of left ulnar nerve, initial encounter  S54.02XA Ambulatory Referral to PT/OT Hand Therapy                     Assessment  Impairments: abnormal coordination, abnormal or restricted ROM, activity intolerance, impaired physical strength and pain with function  Symptom irritability: low    Assessment details: Patient presents with a diagnosis of left ulnar neurapraxia. Patient symptoms have been present for months after leaning for an extended period of time while staining a deck. Patient initially was experiencing left elbow, wrist, and thumb pain, ulnar 4th and 5th digit numbness and tingling, and small finger weakness when gripping and attempting to adduct or abduct fingers. Patient initially saw orthopedics on 24 and had an EMG which was were positive for ulnar neuropathy. Patient also had an MRI that displayed mild extensor carpi ulnaris tendinosis, a perforation type tear at the radial aspect of the TFCC articular disc, degenerative signal scapholunate ligament, and moderate first carpometacarpal joint primary osteoarthritis. Patient is scheduled to have an ultrasound on 24. Initially patient was recommended to have a L endoscopic carpal tunnel release, L Guyon's canal release, left cubital tunnel revision release with submuscular transposition and possible nerve wrapping, AIN to ulnar motor nerve transfer, and left thumb basal joint steroid injection. Patient sought out a 2nd opinion and saw orthopedics on 24 where symptoms were reported to have improved so they were referred to OT for conservative treatment.  Patient has a follow up visit on 24. Patient reports numbness and tingling in digits  4-5. [place] testing displayed WNL light touch sensation in the 4th and 5th digit. Patient presents with wrist and elbow ROM WNL. Digit ROM is impaired in abduction and adduction which worsened following ulnar nerve tension testing. Shoulder ROM is impaired secondary to capsular tightness. /pinch strength is decreased as anticipated. Moderate pain located in the elbow, and palm of hand. Positive special tests including Tinel's at the cubital tunnel, Wartenberg's sign and ulnar nerve tension, which are indicative and consistent with the diagnosis. Patient was provided a custom HEP and instructed on how to complete it. See below for a more detailed assessment.   Understanding of Dx/Px/POC: good     Prognosis: good    Goals  STGs:    Patient will increase  strength by 5-10 lbs in 4 weeks    Patient will increase pinch strength by 3-5 lbs in 4 weeks     Patient will report decreased pain during functional movement by 1-2 grades in 4 weeks    Patient will report decreased numbness and tingling in digits 4-5 in 4 weeks    Patient will demonstrate an understanding of HEP by end of initial session    LTGs:    Patient will increase  strength by 10-20 lbs in 8 weeks or discharge    Patient will improve pinch strength by 5-10 lbs in 8 weeks or discharge    Patient will report the resolution of numbness and tingling in digits 4-5 in 8 weeks or discharge    Patient will report resolution of pain symptoms during functional movement in 8 weeks or discharge          Plan  Patient would benefit from: custom splinting, skilled occupational therapy and OT eval  Referral necessary: No  Planned modality interventions: ultrasound, thermotherapy: hydrocollator packs and unattended electrical stimulation    Planned therapy interventions: IADL retraining, patient education, self care, manual therapy, orthotic fitting/training, strengthening, stretching, therapeutic activities, therapeutic exercise, home exercise  "program, functional ROM exercises and fine motor coordination training    Frequency: 2x week  Duration in weeks: 10  Plan of Care beginning date: 2024  Plan of Care expiration date: 2025  Treatment plan discussed with: patient  Plan details: Patient presenting to OP OT services due to a diagnosis of left ulnar neurapraxia. Patient would benefit from skilled occupational therapy services 2 times per week for 8 weeks to return to prior level of function and achieve all of their established goals. Thank you for the referral.         Subjective Evaluation    History of Present Illness  Mechanism of injury: Subjective:  \"Things are a lot better\". \"The numbness and pain are gone\". \"I was staining a deck and it was fine that day\". \"The next day I had clawing, numbness, pain and weakness\". \"This lasted around 4 weeks\". \"I was put on a steroid around 10 days after it started\". \"The numbness and pain has been resolved for around 2 weeks\". \"I swim laps and I notice my pinky can't flex against the water\". \"My shoulder is about 85% and I'm done with PT\". \"I have no numbness after the Tumeric\". \"Typing bothers it a lot\". \"Driving bothers it\". \"I have a thumb brace and I tried it but it made it worse\".           Recurrent probem    Quality of life: good    Patient Goals  Patient goals for therapy: decreased pain, increased motion, increased strength and independence with ADLs/IADLs    Pain  Current pain ratin  At best pain ratin  At worst pain ratin  Location: Elbow, and palm of hand  Quality: dull ache and sharp  Relieving factors: rest, support and relaxation  Aggravating factors: lifting, keyboarding and overhead activity (Typing, lifting, swimming, jars, driving)  Progression: improved    Social Support    Employment status: working ()  Hand dominance: right      Diagnostic Tests  MRI studies: abnormal  EMG: abnormal  Treatments  Current treatment: occupational therapy        Objective "     Tenderness     Left Wrist/Hand   Tenderness in the scaphoid.     Additional Tenderness Details  L:  Tenderness in ulnar palm   Tenderness at cubital tunnel    Neurological Testing     Sensation     Wrist/Hand   Left   Intact: light touch    Right   Intact: light touch    Active Range of Motion     Left Wrist   Normal active range of motion    Right Wrist   Normal active range of motion    Left Thumb   Kapandji score: 10 degrees      Right Thumb   Kapandji score: 10 degrees    Additional Active Range of Motion Details  B/L Full composite fist    Strength/Myotome Testing     Left Wrist/Hand      (2nd hand position)     Trial 1: 29.3    Thumb Strength  Key/Lateral Pinch     Trial 1: 5  Tip/Two-Point Pinch     Trial 1: 4  Palmar/Three-Point Pinch     Trial 1: 7    Right Wrist/Hand      (2nd hand position)     Trial 1: 41.6    Thumb Strength   Key/Lateral Pinch     Trial 1: 7  Tip/Two-Point Pinch     Trial 1: 6  Palmar/Three-Point Pinch     Trial 1: 10    Tests     Left Elbow   Negative Tinel's sign (cubital tunnel).     Left Wrist/Hand   Positive Froment's sign, TFCC load and Tinel's sign (medial nerve).   Negative AIN OK sign and teardrop pinch.     Additional Tests Details  L:  +Ulnar nerve tension testing  +Wartenberg's sign             Precautions: Universal      Manuals 11/18            IASTM                                                    Neuro Re-Ed             RINA             MNG                                                                              Ther Ex             HEP RINA    Digit Abd/Add    Isometrics            Towel Slides             Med ball rolls             Wrist Maze             [place] abduction             Digiflex             Ext web             Hand Power Pro                                       Ther Activity             [place] elbow extended                                                                 Modalities             MHP

## 2024-11-22 ENCOUNTER — APPOINTMENT (OUTPATIENT)
Dept: OCCUPATIONAL THERAPY | Facility: CLINIC | Age: 54
End: 2024-11-22
Payer: COMMERCIAL

## 2024-12-04 ENCOUNTER — OFFICE VISIT (OUTPATIENT)
Dept: OCCUPATIONAL THERAPY | Facility: CLINIC | Age: 54
End: 2024-12-04
Payer: COMMERCIAL

## 2024-12-04 DIAGNOSIS — S54.02XA NEUROPRAXIA OF LEFT ULNAR NERVE, INITIAL ENCOUNTER: Primary | ICD-10-CM

## 2024-12-04 PROCEDURE — 97112 NEUROMUSCULAR REEDUCATION: CPT

## 2024-12-04 PROCEDURE — 97140 MANUAL THERAPY 1/> REGIONS: CPT

## 2024-12-04 PROCEDURE — 97110 THERAPEUTIC EXERCISES: CPT

## 2024-12-04 NOTE — PROGRESS NOTES
"Daily Note     Today's date: 2024  Patient name: Sherry Franklin  : 1970  MRN: [id number]  Referring provider: Tato Henley MD  Dx:   Encounter Diagnosis     ICD-10-CM    1. Neuropraxia of left ulnar nerve, initial encounter  S54.02XA                      Subjective: \"I can't bar weight on a couch cushion\". \"Driving bothers it too\".        Objective: See treatment diary below      Assessment: Tolerated treatment well. Ulnar neve tension persists with symptoms involving the neck, shoulder, elbow, and writ. Patient is experiencing popping and clicking during median nerve glides, that is likely caused by scar tissue.     Plan: Continue per plan of care.  Progress treatment as tolerated.       Precautions: Universal      Manuals            IASTM  8'                                                  Neuro Re-Ed             RINA  4           MNG  4                                                                            Ther Ex             HEP RINA    Digit Abd/Add    Isometrics            Towel Slides  x20           Med ball rolls  2'           Wrist Maze  x5           [place] abduction  All marbles           Digiflex  Y x 10 iso    R x 20 comp           Ext web  Y x 20           Hand Power Pro  Y x 20                                     Ther Activity             [place] elbow extended  x1                                                               Modalities             MHP  5'                             "

## 2024-12-06 ENCOUNTER — APPOINTMENT (OUTPATIENT)
Dept: OCCUPATIONAL THERAPY | Facility: CLINIC | Age: 54
End: 2024-12-06
Payer: COMMERCIAL

## 2024-12-17 ENCOUNTER — HOSPITAL ENCOUNTER (OUTPATIENT)
Dept: ULTRASOUND IMAGING | Facility: HOSPITAL | Age: 54
Discharge: HOME | End: 2024-12-17
Attending: SURGERY
Payer: COMMERCIAL

## 2024-12-17 DIAGNOSIS — G56.22 CUBITAL TUNNEL SYNDROME ON LEFT: ICD-10-CM

## 2024-12-17 PROCEDURE — 76882 US LMTD JT/FCL EVL NVASC XTR: CPT

## 2024-12-17 PROCEDURE — 64450 NJX AA&/STRD OTHER PN/BRANCH: CPT

## 2024-12-17 PROCEDURE — 76942 ECHO GUIDE FOR BIOPSY: CPT

## 2024-12-17 PROCEDURE — 20606 DRAIN/INJ JOINT/BURSA W/US: CPT

## 2024-12-17 RX ORDER — LIDOCAINE WITH 8.4% SOD BICARB 0.9%(10ML)
10 SYRINGE (ML) INJECTION ONCE
Status: COMPLETED | OUTPATIENT
Start: 2024-12-17 | End: 2024-12-17

## 2024-12-17 RX ORDER — DEXAMETHASONE SODIUM PHOSPHATE 10 MG/ML
4 INJECTION, SOLUTION INTRAMUSCULAR; INTRAVENOUS ONCE
Status: COMPLETED | OUTPATIENT
Start: 2024-12-17 | End: 2024-12-17

## 2024-12-17 RX ADMIN — DEXAMETHASONE SODIUM PHOSPHATE 4 MG: 10 INJECTION INTRAMUSCULAR; INTRAVENOUS at 14:58

## 2024-12-17 RX ADMIN — Medication 10 ML: at 13:58

## 2025-01-23 ENCOUNTER — OFFICE VISIT (OUTPATIENT)
Dept: OBGYN CLINIC | Facility: CLINIC | Age: 55
End: 2025-01-23
Payer: COMMERCIAL

## 2025-01-23 VITALS — HEIGHT: 64 IN | BODY MASS INDEX: 28.17 KG/M2 | WEIGHT: 165 LBS

## 2025-01-23 DIAGNOSIS — M18.12 ARTHRITIS OF CARPOMETACARPAL (CMC) JOINT OF LEFT THUMB: ICD-10-CM

## 2025-01-23 DIAGNOSIS — M77.02 MEDIAL EPICONDYLITIS OF ELBOW, LEFT: ICD-10-CM

## 2025-01-23 DIAGNOSIS — S54.02XA NEUROPRAXIA OF LEFT ULNAR NERVE, INITIAL ENCOUNTER: Primary | ICD-10-CM

## 2025-01-23 PROCEDURE — 99213 OFFICE O/P EST LOW 20 MIN: CPT | Performed by: SURGERY

## 2025-01-23 NOTE — PROGRESS NOTES
ASSESSMENT/PLAN:      54 year old female here for her left ulnar neurapraxia, DOI mid- September. Overall, the sensation is is improving and the motor function is getting better.  After reviewing the EMG, difficult to localize the level of ulnar nerve pathology. Also has left thumb CMC arthritis and has now developed left medial epicondylitis     US results were reviewed, no evidence of cubital tunnel syndrome or guyon's canal. We discussed the medial elbow tenderness she is experiencing is medial epicondylitis based on PE. We discussed therapy can be beneficial for medial epicondylitis, OT script was provided. She was fit with a cock up wrist brace to be worn at night. We discussed the possibility of a medial epicondylitis CSI, she declined at this time a she would like to try nighttime bracing and therapy first. Follow up in 2 months time for clinical re-evaluation.     The patient verbalized understanding of exam findings and treatment plan. We engaged in the shared decision-making process and treatment options were discussed at length with the patient. Surgical and conservative management discussed today along with risks and benefits.    Diagnoses and all orders for this visit:    Neuropraxia of left ulnar nerve, initial encounter    Medial epicondylitis of elbow, left  -     Ambulatory Referral to PT/OT Hand Therapy; Future  -     Cock Up Wrist Splint    Arthritis of carpometacarpal (CMC) joint of left thumb      Follow Up:  Return in about 2 months (around 3/23/2025).      To Do Next Visit:  Re-evaluation of current issue    ____________________________________________________________________________________________________________________________________________      CHIEF COMPLAINT:  Chief Complaint   Patient presents with    Follow-up     Follow up of the left wrist and elbow.        SUBJECTIVE:  Sherry Franklin is a 54 y.o. year old RHD female who presents to the office for a follow up visit. She is here  today to review US results. She underwent a US guided left cubital tunnel CSI, which improved the numbness and tingling to her ulnar 2 digits. She feels numbness and tingling has resolved at this time, but does get whole hand numbness at night. She feels increased pain to the medial aspect of her elbow. Pain will worsen when she swims, more so with arm strokes. She does perform therapy exercises at home. She started taking Tumeric with Black pepper and feels this has helped her left thumb CMC joint pain.        I have personally reviewed all the relevant PMH, PSH, SH, FH, Medications and allergies.     PAST MEDICAL HISTORY:  History reviewed. No pertinent past medical history.    PAST SURGICAL HISTORY:  Past Surgical History:   Procedure Laterality Date    GALLBLADDER SURGERY      HAND SURGERY      US GUIDED THYROID BIOPSY  9/24/2015       FAMILY HISTORY:  Family History   Problem Relation Age of Onset    Cancer Mother        SOCIAL HISTORY:  Social History     Tobacco Use    Smoking status: Never    Smokeless tobacco: Never       MEDICATIONS:    Current Outpatient Medications:     meloxicam (MOBIC) 15 mg tablet, Take 1 tablet by mouth, Disp: , Rfl:     levocetirizine (XYZAL) 5 MG tablet, , Disp: , Rfl:     ALLERGIES:  No Known Allergies    REVIEW OF SYSTEMS:  Review of Systems   Constitutional:  Negative for chills, fever and unexpected weight change.   HENT:  Negative for hearing loss, nosebleeds and sore throat.    Eyes:  Negative for pain, redness and visual disturbance.   Respiratory:  Negative for cough, shortness of breath and wheezing.    Cardiovascular:  Negative for chest pain, palpitations and leg swelling.   Gastrointestinal:  Negative for abdominal pain, nausea and vomiting.   Endocrine: Negative for polydipsia and polyuria.   Genitourinary:  Negative for difficulty urinating and hematuria.   Musculoskeletal:  Negative for arthralgias, joint swelling and myalgias.   Skin:  Negative for rash and wound.  "  Neurological:  Negative for dizziness, numbness and headaches.   Psychiatric/Behavioral:  Negative for decreased concentration, dysphoric mood and suicidal ideas. The patient is not nervous/anxious.        VITALS:  There were no vitals filed for this visit.      _____________________________________________________  PHYSICAL EXAMINATION:  General: well developed and well nourished, alert, oriented times 3, and appears comfortable  Psychiatric: Normal  HEENT: Normocephalic, Atraumatic Trachea Midline, No torticollis  Pulmonary: No audible wheezing or respiratory distress   Cardiovascular: No pitting edema, 2+ radial pulse   Abdominal/GI: abdomen non tender, non distended   Skin: No masses, erythema, lacerations, fluctation, ulcerations  Neurovascular: Sensation Intact to the Median, Ulnar, Radial Nerve, Motor Intact to the Median, Ulnar, Radial Nerve, and Pulses Intact  Musculoskeletal: Normal, except as noted in detailed exam and in HPI.      MUSCULOSKELETAL EXAMINATION:    Left elbow:     No erythema, ecchymosis or edema  Medial epicondyle tenderness  Pain with rested wrist flexion   Full elbow ROM   ___________________________________________________    STUDIES REVIEWED:  I have personally reviewed and interpreted  US of left wrist and left elbow which demonstrate no evidence of cubital tunnel syndrome. No evidence of Guyon's canal syndrome. No hyperemia. No evidence of ulnar nerve subluxation.      LABS REVIEWED:    HgA1c: No results found for: \"HGBA1C\"  BMP:   Lab Results   Component Value Date    CALCIUM 9.5 07/15/2024    K 5.0 07/15/2024    CO2 27 07/15/2024     07/15/2024    BUN 16 07/15/2024    CREATININE 0.91 07/15/2024             PROCEDURES PERFORMED:  Procedures  No Procedures performed today    _____________________________________________________      Scribe Attestation      I,:  Zoë Woodard MA am acting as a scribe while in the presence of the attending physician.:       I,:  Tato" MD Lory personally performed the services described in this documentation    as scribed in my presence.:

## 2025-05-27 DIAGNOSIS — Z00.6 ENCOUNTER FOR EXAMINATION FOR NORMAL COMPARISON OR CONTROL IN CLINICAL RESEARCH PROGRAM: ICD-10-CM

## 2025-05-29 ENCOUNTER — APPOINTMENT (OUTPATIENT)
Dept: LAB | Facility: CLINIC | Age: 55
End: 2025-05-29

## 2025-05-29 DIAGNOSIS — Z00.6 ENCOUNTER FOR EXAMINATION FOR NORMAL COMPARISON OR CONTROL IN CLINICAL RESEARCH PROGRAM: ICD-10-CM

## 2025-05-29 PROCEDURE — 36415 COLL VENOUS BLD VENIPUNCTURE: CPT

## 2025-06-09 LAB
APOB+LDLR+PCSK9 GENE MUT ANL BLD/T: NOT DETECTED
BRCA1+BRCA2 DEL+DUP + FULL MUT ANL BLD/T: NOT DETECTED
MLH1+MSH2+MSH6+PMS2 GN DEL+DUP+FUL M: NOT DETECTED